# Patient Record
Sex: MALE | Race: WHITE | NOT HISPANIC OR LATINO | Employment: FULL TIME | URBAN - METROPOLITAN AREA
[De-identification: names, ages, dates, MRNs, and addresses within clinical notes are randomized per-mention and may not be internally consistent; named-entity substitution may affect disease eponyms.]

---

## 2024-04-12 ENCOUNTER — OFFICE VISIT (OUTPATIENT)
Dept: PRIMARY CARE | Facility: CLINIC | Age: 39
End: 2024-04-12
Payer: COMMERCIAL

## 2024-04-12 ENCOUNTER — HOSPITAL ENCOUNTER (OUTPATIENT)
Dept: RADIOLOGY | Facility: CLINIC | Age: 39
Discharge: HOME | End: 2024-04-12
Payer: COMMERCIAL

## 2024-04-12 VITALS
HEIGHT: 64 IN | DIASTOLIC BLOOD PRESSURE: 70 MMHG | RESPIRATION RATE: 14 BRPM | WEIGHT: 145 LBS | BODY MASS INDEX: 24.75 KG/M2 | HEART RATE: 62 BPM | SYSTOLIC BLOOD PRESSURE: 101 MMHG | OXYGEN SATURATION: 98 %

## 2024-04-12 DIAGNOSIS — Z00.00 HEALTH CARE MAINTENANCE: Primary | ICD-10-CM

## 2024-04-12 DIAGNOSIS — R06.02 SHORTNESS OF BREATH: ICD-10-CM

## 2024-04-12 DIAGNOSIS — R07.89 MUSCULOSKELETAL CHEST PAIN: ICD-10-CM

## 2024-04-12 PROBLEM — S52.502A CLOSED FRACTURE OF LEFT DISTAL RADIUS: Status: ACTIVE | Noted: 2024-04-12

## 2024-04-12 PROCEDURE — 71046 X-RAY EXAM CHEST 2 VIEWS: CPT | Performed by: RADIOLOGY

## 2024-04-12 PROCEDURE — 1036F TOBACCO NON-USER: CPT | Performed by: INTERNAL MEDICINE

## 2024-04-12 PROCEDURE — 99203 OFFICE O/P NEW LOW 30 MIN: CPT | Performed by: INTERNAL MEDICINE

## 2024-04-12 PROCEDURE — 71046 X-RAY EXAM CHEST 2 VIEWS: CPT

## 2024-04-12 NOTE — PROGRESS NOTES
"Subjective   Patient ID: Patrice Brown is a 38 y.o. male who presents for No chief complaint on file..    HPI     Patient is a 38-year-old male with no significant past medical history presents with chief complaint of anterior chest pain.  Patient states that it comes and goes.  He cannot say how long it lasts for.  He did go to the emergency room on March 30, 2023 for and he had a CT abdomen and pelvis which was for the most part unremarkable.  No chest imaging was done.  There is some tenderness to palpation.  He denies any shortness of breath when he takes a deep breath they can often exacerbate the anterior chest pain.  His pulse ox is 98% on room air and his heart rate is quite low at 64.  No recent travel.  No history of cigarette smoking.  No history of asthma or COPD.  The pain is slightly reproducible.  The pain he describes it more of a burning of the anterior chest.    Review of Systems  Constitutional: No fever or chills  Cardiovascular: no chest pain, no palpitations and no syncope.   Respiratory: no cough, no shortness of breath during exertion and no shortness of breath at rest.   Gastrointestinal: no abdominal pain, no nausea and no vomiting.  Neuro: No Headache, no dizziness    Objective   /70   Pulse 62   Resp 14   Ht 1.626 m (5' 4\")   Wt 65.8 kg (145 lb)   SpO2 98%   BMI 24.89 kg/m²     Physical Exam  Constitutional: Alert and in no acute distress. Well developed, well nourished  Head and Face: Head and face: Normal.    Cardiovascular: Heart rate and rhythm were normal, normal S1 and S2. No peripheral edema.   Pulmonary: No respiratory distress. Clear bilateral breath sounds.  Musculoskeletal: Gait and station: Normal. Muscle strength/tone: Normal.   Skin: Normal skin color and pigmentation, normal skin turgor, and no rash.    Psychiatric: Judgment and insight: Intact. Mood and affect: Normal.  Abdomen soft nontender nondistended positive bowel sounds x 4    Procedures    Lab Results "   Component Value Date    WBC 8.8 12/30/2019    HGB 13.5 12/30/2019    HCT 39.2 (L) 12/30/2019     12/30/2019     12/30/2019    K 3.7 12/30/2019     (H) 12/30/2019    CREATININE 0.83 12/30/2019    BUN 16 12/30/2019    CO2 25 12/30/2019    INR 1.0 07/14/2019       WRIST  MRN: [id number]  Patient Name: KATY HAYNES     STUDY:  WRIST COMPLT; MIN 3 VIEWS     INDICATION:  stiffness, need to ensure healed.     COMPARISON:  January 6, 2020     ACCESSION NUMBER(S):  [id number]     ORDERING CLINICIAN:  HOLLIS HOWARD     FINDINGS:  Right distal radial fracture is healed with some residual deformity.  The ghost tracts from the previous fixation hardware continue to heal  Ulnar styloid fracture with nonunion.     IMPRESSION:  Interval healing of the right distal radial fracture.            Assessment/Plan   Problem List Items Addressed This Visit    None  Visit Diagnoses         Codes    Health care maintenance    -  Primary Z00.00    Relevant Orders    Lipid Panel    TSH with reflex to Free T4 if abnormal    Shortness of breath     R06.02    Relevant Orders    D-dimer, quantitative    XR chest 2 views    Musculoskeletal chest pain     R07.89          More than likely the symptoms are consistent with musculoskeletal chest pain.  Recommend ibuprofen 400 mg 3 times a day.  Will check chest x-ray to rule out intra pulmonary pathology.  Check D-dimer.  If elevated will obtain CT chest angio.  Check TSH.  While there could be an anxiety component to it as such recommend an outpatient diary of symptoms and document what is going on at the time of the symptoms appearing.  Follow-up as needed

## 2024-09-23 ENCOUNTER — APPOINTMENT (OUTPATIENT)
Dept: PRIMARY CARE | Facility: CLINIC | Age: 39
End: 2024-09-23
Payer: COMMERCIAL

## 2024-09-23 VITALS
WEIGHT: 140 LBS | SYSTOLIC BLOOD PRESSURE: 145 MMHG | OXYGEN SATURATION: 98 % | DIASTOLIC BLOOD PRESSURE: 78 MMHG | BODY MASS INDEX: 24.03 KG/M2 | HEART RATE: 57 BPM

## 2024-09-23 DIAGNOSIS — F41.9 ANXIETY: Primary | ICD-10-CM

## 2024-09-23 PROCEDURE — 99213 OFFICE O/P EST LOW 20 MIN: CPT | Performed by: INTERNAL MEDICINE

## 2024-09-23 PROCEDURE — 1036F TOBACCO NON-USER: CPT | Performed by: INTERNAL MEDICINE

## 2024-09-23 RX ORDER — SERTRALINE HYDROCHLORIDE 50 MG/1
50 TABLET, FILM COATED ORAL DAILY
Qty: 30 TABLET | Refills: 1 | Status: SHIPPED | OUTPATIENT
Start: 2024-09-23 | End: 2024-11-22

## 2024-09-23 NOTE — PROGRESS NOTES
Subjective   Patient ID: Patrice Brown is a 39 y.o. male who presents for Panic Attack.    HPI     Patient is a 39-year-old male with no significant past medical history presents for sick visit.  Patient reports episodes of palpitations that can last upwards of 30 minutes.  They self terminate.  No feeling of doom or gloom.  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  Sometimes he has to go in the middle of the night to unsafe neighborhoods and apartment complexes.  He occasionally gets chest pains that goes away as distress subsides.  No shortness of breath and no current chest pain.        Review of Systems  Constitutional: No fever or chills  Cardiovascular: no chest pain, no palpitations and no syncope.   Respiratory: no cough, no shortness of breath during exertion and no shortness of breath at rest.   Gastrointestinal: no abdominal pain, no nausea and no vomiting.  Neuro: No Headache, no dizziness    Objective   /78   Pulse 57   Wt 63.5 kg (140 lb)   SpO2 98%   BMI 24.03 kg/m²     Physical Exam  Constitutional: Alert and in no acute distress. Well developed, well nourished  Head and Face: Head and face: Normal.    Cardiovascular: Heart rate and rhythm were normal, normal S1 and S2. No peripheral edema.   Pulmonary: No respiratory distress. Clear bilateral breath sounds.  Musculoskeletal: Gait and station: Normal. Muscle strength/tone: Normal.   Skin: Normal skin color and pigmentation, normal skin turgor, and no rash.    Psychiatric: Judgment and insight: Intact. Mood and affect: Normal.    Procedures    Lab Results   Component Value Date    WBC 8.8 12/30/2019    HGB 13.5 12/30/2019    HCT 39.2 (L) 12/30/2019     12/30/2019     12/30/2019    K 3.7 12/30/2019     (H) 12/30/2019    CREATININE 0.83 12/30/2019    BUN 16 12/30/2019    CO2 25 12/30/2019    INR 1.0 07/14/2019       XR chest 2 views  Narrative: Interpreted By:  Wilfrido Stewart,   STUDY:  XR CHEST 2  VIEWS Dual energy subtraction      INDICATION:  Signs/Symptoms:chest pain.      COMPARISON:  September 26, 2019          ACCESSION NUMBER(S):  [id number]      ORDERING CLINICIAN:  SUSAN ARMSTRONG      FINDINGS:  No consolidation, effusion, edema, or pneumothorax. Heart size and  mediastinal structures within normal limits.      Impression: No evidence of acute intrathoracic abnormality.      Signed by: Wilfrido Stewart 4/13/2024 8:00 AM  Dictation workstation:   [id number]            Assessment/Plan   Problem List Items Addressed This Visit             ICD-10-CM    Anxiety - Primary F41.9    Relevant Medications    sertraline (Zoloft) 50 mg tablet    Other Relevant Orders    Follow Up In [facility] - PCP - Established     Probable anxiety.  Will start sertraline 50 mg once daily.  Follow-up 30 days for reevaluation.

## 2024-09-24 ENCOUNTER — OFFICE VISIT (OUTPATIENT)
Dept: URGENT CARE | Age: 39
End: 2024-09-24
Payer: COMMERCIAL

## 2024-09-24 ENCOUNTER — TELEPHONE (OUTPATIENT)
Dept: PRIMARY CARE | Facility: CLINIC | Age: 39
End: 2024-09-24
Payer: COMMERCIAL

## 2024-09-24 VITALS
HEART RATE: 65 BPM | TEMPERATURE: 98.5 F | WEIGHT: 140 LBS | OXYGEN SATURATION: 97 % | BODY MASS INDEX: 24.03 KG/M2 | DIASTOLIC BLOOD PRESSURE: 87 MMHG | SYSTOLIC BLOOD PRESSURE: 127 MMHG | RESPIRATION RATE: 19 BRPM

## 2024-09-24 DIAGNOSIS — F41.0 PANIC DISORDER: Primary | ICD-10-CM

## 2024-09-24 NOTE — PROGRESS NOTES
Subjective   Patient ID: Patrice Brown is a 39 y.o. male. They present today with a chief complaint of Other (Blurry vision, sweats ).    History of Present Illness  Reports intermittent waves of warmth going from his legs up to his neck, sometimes associated with a racing heart x2 weeks. Symptoms last for a few minutes up to an hour without clear trigger. States it feels like his skin in sunburned. States his resting heart rate is usually in the 60s, but will go up to 120-130 when symptoms occur. States he has the feeling of warmth in his legs and chest currently, but no palpitations. No pain, SOB, numbness, tingling, weakness, fever, URI symptoms. Started on sertaline but started it only today. Otherwise denies supplements. States he works long hours, is always on the move, and is under a lot of stress from work x7 years but has never had these symptoms. Was evaluated in the ED 3 days ago for palpitations, EKG normal, referred to cardiology for holter monitor.          Past Medical History  Allergies as of 09/24/2024 - Reviewed 09/24/2024   Allergen Reaction Noted    Vancomycin Other 04/12/2024       (Not in a hospital admission)       Past Medical History:   Diagnosis Date    Other specified health status     No pertinent past medical history       Past Surgical History:   Procedure Laterality Date    OTHER SURGICAL HISTORY  10/17/2019    Wrist surgery        reports that he has never smoked. He has never used smokeless tobacco.    Review of Systems  Pertinent systems reviewed and were negative unless otherwise stated in HPI.    Objective    Vitals:    09/24/24 1312   BP: 127/87   Pulse: 65   Resp: 19   Temp: 36.9 °C (98.5 °F)   SpO2: 97%   Weight: 63.5 kg (140 lb)     No LMP for male patient.    Physical Exam  Constitutional:       General: He is not in acute distress.  HENT:      Right Ear: Tympanic membrane and ear canal normal.      Left Ear: Tympanic membrane and ear canal normal.      Nose: Nose normal.       Mouth/Throat:      Mouth: Mucous membranes are moist.      Pharynx: Oropharynx is clear.   Eyes:      Conjunctiva/sclera: Conjunctivae normal.   Cardiovascular:      Rate and Rhythm: Normal rate and regular rhythm.      Pulses: Normal pulses.      Heart sounds: Normal heart sounds.   Pulmonary:      Effort: Pulmonary effort is normal. No respiratory distress.      Breath sounds: No wheezing.   Skin:     Findings: No rash.   Psychiatric:         Mood and Affect: Mood normal.         Behavior: Behavior normal.         Diagnostic study results (if any) were reviewed by Surya Peñaloza PA-C.    Assessment/Plan   Allergies, medications, history, and pertinent labs/EKGs/imaging reviewed by Surya Peñaloza PA-C.     Medical Decision Making  Low concern for MI, PE. Likely panic disorder from stress and anxiety. Patient mentioned hyperthyroidism, however I advised any evaluation for this would need to come from primary care. ED precautions given. No indication for EKG, a patient's vitals are WNL, heart regular rhythm and no palpitations currently. Has Holter monitor referral in. Had reassuring ED evaluation 3 days ago.     Orders and Diagnoses  Diagnoses and all orders for this visit:  Panic disorder      Medical Admin Record      Disposition: Home    Electronically signed by Surya Peñaloza PA-C

## 2024-09-24 NOTE — PATIENT INSTRUCTIONS
Please follow up with your primary provider within one week if symptoms do not improve.  You may schedule an appointment online at [email or web address] or call [phone]. Go to the Emergency Department if symptoms significantly worsen or if you develop chest pain or shortness of breath.

## 2024-10-30 ENCOUNTER — APPOINTMENT (OUTPATIENT)
Dept: PRIMARY CARE | Facility: CLINIC | Age: 39
End: 2024-10-30
Payer: COMMERCIAL

## 2024-10-30 VITALS
SYSTOLIC BLOOD PRESSURE: 121 MMHG | HEART RATE: 51 BPM | OXYGEN SATURATION: 98 % | WEIGHT: 142 LBS | DIASTOLIC BLOOD PRESSURE: 79 MMHG | BODY MASS INDEX: 24.37 KG/M2

## 2024-10-30 DIAGNOSIS — F41.9 ANXIETY: ICD-10-CM

## 2024-10-30 PROCEDURE — 99213 OFFICE O/P EST LOW 20 MIN: CPT | Performed by: INTERNAL MEDICINE

## 2024-10-30 PROCEDURE — 1036F TOBACCO NON-USER: CPT | Performed by: INTERNAL MEDICINE

## 2024-10-30 RX ORDER — SERTRALINE HYDROCHLORIDE 50 MG/1
50 TABLET, FILM COATED ORAL DAILY
Qty: 90 TABLET | Refills: 1 | Status: SHIPPED | OUTPATIENT
Start: 2024-10-30 | End: 2025-04-28

## 2024-12-26 ENCOUNTER — APPOINTMENT (OUTPATIENT)
Dept: URGENT CARE | Age: 39
End: 2024-12-26
Payer: COMMERCIAL

## 2025-01-02 ENCOUNTER — APPOINTMENT (OUTPATIENT)
Dept: PRIMARY CARE | Facility: CLINIC | Age: 40
End: 2025-01-02
Payer: COMMERCIAL

## 2025-01-02 VITALS
SYSTOLIC BLOOD PRESSURE: 107 MMHG | HEART RATE: 58 BPM | DIASTOLIC BLOOD PRESSURE: 73 MMHG | WEIGHT: 142 LBS | BODY MASS INDEX: 24.37 KG/M2 | OXYGEN SATURATION: 97 %

## 2025-01-02 DIAGNOSIS — G25.3 MYOCLONUS: Primary | ICD-10-CM

## 2025-01-02 PROCEDURE — 1036F TOBACCO NON-USER: CPT | Performed by: INTERNAL MEDICINE

## 2025-01-02 PROCEDURE — 99213 OFFICE O/P EST LOW 20 MIN: CPT | Performed by: INTERNAL MEDICINE

## 2025-01-02 RX ORDER — EPINEPHRINE 0.22MG
100 AEROSOL WITH ADAPTER (ML) INHALATION DAILY
Qty: 30 CAPSULE | Refills: 11 | Status: SHIPPED | OUTPATIENT
Start: 2025-01-02 | End: 2026-01-02

## 2025-01-02 ASSESSMENT — ENCOUNTER SYMPTOMS: MUSCULOSKELETAL PAIN: 1

## 2025-01-02 NOTE — PROGRESS NOTES
Subjective   Patient ID: Patrice Brown is a 39 y.o. male who presents for Follow-up and Muscle Pain.    Musculoskeletal Pain         Patient is a 39-year-old male with past medical history of anxiety currently on sertraline with good improvement presents complaining of muscle twitching of the lower extremities.  Mostly affecting the left lower extremity but occasionally the right.  He went to the emergency room and electrolytes were not abnormal.  Glucose was slightly elevated at 111.  He stopped the Aleve prior to going to the emergency room and states that the twitching had improved.  In the emergency room the only abnormal finding was arguably the magnesium which was 1.9.  No fevers or chills and no muscle weakness.  No numbness or tingling of the extremities    Review of Systems  Constitutional: No fever or chills  Cardiovascular: no chest pain, no palpitations and no syncope.   Respiratory: no cough, no shortness of breath during exertion and no shortness of breath at rest.   Gastrointestinal: no abdominal pain, no nausea and no vomiting.  Neuro: No Headache, no dizziness    Objective   /73   Pulse 58   Wt 64.4 kg (142 lb)   SpO2 97%   BMI 24.37 kg/m²     Physical Exam  Constitutional: Alert and in no acute distress. Well developed, well nourished  Head and Face: Head and face: Normal.    Cardiovascular: Heart rate and rhythm were normal, normal S1 and S2. No peripheral edema.   Pulmonary: No respiratory distress. Clear bilateral breath sounds.  Musculoskeletal: Gait and station: Normal. Muscle strength/tone: Normal.   Skin: Normal skin color and pigmentation, normal skin turgor, and no rash.    Psychiatric: Judgment and insight: Intact. Mood and affect: Normal.    Procedures    Lab Results   Component Value Date    WBC 8.8 12/30/2019    HGB 13.5 12/30/2019    HCT 39.2 (L) 12/30/2019     12/30/2019     12/30/2019    K 3.7 12/30/2019     (H) 12/30/2019    CREATININE 0.83 12/30/2019     BUN 16 12/30/2019    CO2 25 12/30/2019    INR 1.0 07/14/2019       XR chest 2 views  Narrative: Interpreted By:  Wilfrido Stewart,   STUDY:  XR CHEST 2 VIEWS Dual energy subtraction      INDICATION:  Signs/Symptoms:chest pain.      COMPARISON:  September 26, 2019          ACCESSION NUMBER(S):  [id number]      ORDERING CLINICIAN:  SUSAN ARSMTRONG      FINDINGS:  No consolidation, effusion, edema, or pneumothorax. Heart size and  mediastinal structures within normal limits.      Impression: No evidence of acute intrathoracic abnormality.      Signed by: Wilfrido Stewart 4/13/2024 8:00 AM  Dictation workstation:   [id number]            Assessment/Plan   Problem List Items Addressed This Visit    None  Visit Diagnoses         Codes    Myoclonus    -  Primary G25.3    Relevant Medications    coenzyme Q-10 (Co Q-10) 100 mg capsule        Continue magnesium oxide and start coenzyme every 10.  Drink plenty of water and electrolyte-based beverages.  Decrease sugar intake.  Reassess symptoms if symptoms do not improve then 1 to 2 months

## 2025-04-30 ENCOUNTER — APPOINTMENT (OUTPATIENT)
Dept: PRIMARY CARE | Facility: CLINIC | Age: 40
End: 2025-04-30
Payer: COMMERCIAL

## 2025-04-30 VITALS
DIASTOLIC BLOOD PRESSURE: 76 MMHG | OXYGEN SATURATION: 97 % | BODY MASS INDEX: 25.23 KG/M2 | HEART RATE: 51 BPM | WEIGHT: 147 LBS | SYSTOLIC BLOOD PRESSURE: 115 MMHG

## 2025-04-30 DIAGNOSIS — F41.9 ANXIETY: ICD-10-CM

## 2025-04-30 DIAGNOSIS — G25.3 MYOCLONUS: Primary | ICD-10-CM

## 2025-04-30 PROCEDURE — 1036F TOBACCO NON-USER: CPT | Performed by: INTERNAL MEDICINE

## 2025-04-30 PROCEDURE — 99213 OFFICE O/P EST LOW 20 MIN: CPT | Performed by: INTERNAL MEDICINE

## 2025-04-30 ASSESSMENT — ENCOUNTER SYMPTOMS: MUSCULOSKELETAL PAIN: 1

## 2025-04-30 NOTE — ASSESSMENT & PLAN NOTE
Could be a side effect of the Zoloft.  Patient wishes to continue the Zoloft rather than take a drug holiday.  Symptoms are quite tolerable.  No obvious source.  Recommend symptom diary at this time and follow-up in 3 to 6 months

## 2025-04-30 NOTE — PROGRESS NOTES
Subjective   Patient ID: Patrice Brown is a 39 y.o. male who presents for Follow-up.    Muscle Pain         Patient is a 39-year-old male with past medical history of anxiety currently on sertraline with good improvement presents complaining of muscle twitching of the lower extremities.  Mostly affecting the left lower extremity but occasionally the right.  He went to the emergency room and electrolytes were not abnormal.  Glucose was slightly elevated at 111.  He tried magnesium oxide and co-Q10 without significant improvement.  There is been no changes in his symptoms.  Symptoms appear to have begun after increasing the dose of the Zoloft    Review of Systems  Constitutional: No fever or chills  Cardiovascular: no chest pain, no palpitations and no syncope.   Respiratory: no cough, no shortness of breath during exertion and no shortness of breath at rest.   Gastrointestinal: no abdominal pain, no nausea and no vomiting.  Neuro: No Headache, no dizziness    Objective   /76   Pulse 51   Wt 66.7 kg (147 lb)   SpO2 97%   BMI 25.23 kg/m²     Physical Exam  Constitutional: Alert and in no acute distress. Well developed, well nourished  Head and Face: Head and face: Normal.    Cardiovascular: Heart rate and rhythm were normal, normal S1 and S2. No peripheral edema.   Pulmonary: No respiratory distress. Clear bilateral breath sounds.  Musculoskeletal: Gait and station: Normal. Muscle strength/tone: Normal.   Skin: Normal skin color and pigmentation, normal skin turgor, and no rash.    Psychiatric: Judgment and insight: Intact. Mood and affect: Normal.    Procedures    Lab Results   Component Value Date    WBC 8.8 12/30/2019    HGB 13.5 12/30/2019    HCT 39.2 (L) 12/30/2019     12/30/2019     12/30/2019    K 3.7 12/30/2019     (H) 12/30/2019    CREATININE 0.83 12/30/2019    BUN 16 12/30/2019    CO2 25 12/30/2019    INR 1.0 07/14/2019       XR chest 2 views  Narrative: Interpreted By:  Terry  Wilfrido,   STUDY:  XR CHEST 2 VIEWS Dual energy subtraction      INDICATION:  Signs/Symptoms:chest pain.      COMPARISON:  September 26, 2019          ACCESSION NUMBER(S):  [id number]      ORDERING CLINICIAN:  SUSAN ARMSTRONG      FINDINGS:  No consolidation, effusion, edema, or pneumothorax. Heart size and  mediastinal structures within normal limits.      Impression: No evidence of acute intrathoracic abnormality.      Signed by: Wilfrido Stewart 4/13/2024 8:00 AM  Dictation workstation:   [id number]            Assessment/Plan   Problem List Items Addressed This Visit           ICD-10-CM    Anxiety F41.9    Myoclonus - Primary G25.3    Could be a side effect of the Zoloft.  Patient wishes to continue the Zoloft rather than take a drug holiday.  Symptoms are quite tolerable.  No obvious source.  Recommend symptom diary at this time and follow-up in 3 to 6 months

## 2025-05-29 DIAGNOSIS — F41.9 ANXIETY: ICD-10-CM

## 2025-05-29 RX ORDER — SERTRALINE HYDROCHLORIDE 50 MG/1
50 TABLET, FILM COATED ORAL DAILY
Qty: 90 TABLET | Refills: 0 | Status: SHIPPED | OUTPATIENT
Start: 2025-05-29

## 2025-07-07 ENCOUNTER — APPOINTMENT (OUTPATIENT)
Dept: PRIMARY CARE | Facility: CLINIC | Age: 40
End: 2025-07-07
Payer: COMMERCIAL

## 2025-07-29 ENCOUNTER — APPOINTMENT (OUTPATIENT)
Dept: PRIMARY CARE | Facility: CLINIC | Age: 40
End: 2025-07-29
Payer: COMMERCIAL

## 2025-07-29 VITALS
SYSTOLIC BLOOD PRESSURE: 112 MMHG | OXYGEN SATURATION: 96 % | BODY MASS INDEX: 25.23 KG/M2 | DIASTOLIC BLOOD PRESSURE: 76 MMHG | WEIGHT: 147 LBS | HEART RATE: 55 BPM

## 2025-07-29 DIAGNOSIS — Z00.00 HEALTH CARE MAINTENANCE: ICD-10-CM

## 2025-07-29 DIAGNOSIS — F41.9 ANXIETY: Primary | ICD-10-CM

## 2025-07-29 PROCEDURE — 99213 OFFICE O/P EST LOW 20 MIN: CPT | Performed by: INTERNAL MEDICINE

## 2025-07-29 PROCEDURE — 1036F TOBACCO NON-USER: CPT | Performed by: INTERNAL MEDICINE

## 2025-07-29 RX ORDER — SERTRALINE HYDROCHLORIDE 50 MG/1
50 TABLET, FILM COATED ORAL DAILY
Qty: 90 TABLET | Refills: 3 | Status: SHIPPED | OUTPATIENT
Start: 2025-07-29

## 2025-07-29 ASSESSMENT — ENCOUNTER SYMPTOMS: MUSCULOSKELETAL PAIN: 1

## 2025-07-29 NOTE — PROGRESS NOTES
Subjective   Patient ID: Patrice Brown is a 39 y.o. male who presents for Follow-up.    Muscle Pain         Patient is a 39-year-old male with past medical history of anxiety currently on sertraline with good improvement presents complaining of muscle twitching of the lower extremities.  Last visit the sertraline was decreased as it was thought to be a potential side effect.  Patient has been taking co-Q10.  There is been no substantial improvement in the myoclonus.  Affecting bilateral lower extremities.  Occurs randomly.  No other neurologic findings.  He states since decreasing the sertraline his wife has noted difference in his behavior however he himself has not noticed a difference.        Review of Systems  Constitutional: No fever or chills  Cardiovascular: no chest pain, no palpitations and no syncope.   Respiratory: no cough, no shortness of breath during exertion and no shortness of breath at rest.   Gastrointestinal: no abdominal pain, no nausea and no vomiting.  Neuro: No Headache, no dizziness    Objective   /76   Pulse 55   Wt 66.7 kg (147 lb)   SpO2 96%   BMI 25.23 kg/m²     Physical Exam  Constitutional: Alert and in no acute distress. Well developed, well nourished  Head and Face: Head and face: Normal.    Cardiovascular: Heart rate and rhythm were normal, normal S1 and S2. No peripheral edema.   Pulmonary: No respiratory distress. Clear bilateral breath sounds.  Musculoskeletal: Gait and station: Normal. Muscle strength/tone: Normal.   Skin: Normal skin color and pigmentation, normal skin turgor, and no rash.    Psychiatric: Judgment and insight: Intact. Mood and affect: Normal.    Procedures    Lab Results   Component Value Date    WBC 8.8 12/30/2019    HGB 13.5 12/30/2019    HCT 39.2 (L) 12/30/2019     12/30/2019     12/30/2019    K 3.7 12/30/2019     (H) 12/30/2019    CREATININE 0.83 12/30/2019    BUN 16 12/30/2019    CO2 25 12/30/2019    INR 1.0 07/14/2019       XR  chest 2 views  Narrative: Interpreted By:  Wilfrido Stewart,   STUDY:  XR CHEST 2 VIEWS Dual energy subtraction      INDICATION:  Signs/Symptoms:chest pain.      COMPARISON:  September 26, 2019          ACCESSION NUMBER(S):  [id number]      ORDERING CLINICIAN:  SUSAN ARMSTRONG      FINDINGS:  No consolidation, effusion, edema, or pneumothorax. Heart size and  mediastinal structures within normal limits.      Impression: No evidence of acute intrathoracic abnormality.      Signed by: Wilfrido Stewart 4/13/2024 8:00 AM  Dictation workstation:   [id number]            Assessment/Plan   Problem List Items Addressed This Visit           ICD-10-CM    Anxiety - Primary F41.9    Relevant Medications    sertraline (Zoloft) 50 mg tablet    Other Relevant Orders    Follow Up In [facility] - PCP - Established     Other Visit Diagnoses         Codes      Health care maintenance     Z00.00    Relevant Orders    C-reactive protein    CK    Comprehensive metabolic panel    Lipid panel          Myoclonus likely benign.  Will check screening labs but if unremarkable no further workup required.  If symptoms or not getting any better or getting worse can consider neuromuscular referral.    Will increase the sertraline back to the 50 mg dosing.  Follow-up 6 months.

## 2025-07-30 ENCOUNTER — APPOINTMENT (OUTPATIENT)
Dept: PRIMARY CARE | Facility: CLINIC | Age: 40
End: 2025-07-30
Payer: COMMERCIAL

## 2025-07-30 LAB
ALBUMIN SERPL-MCNC: 4.6 G/DL (ref 3.6–5.1)
ALP SERPL-CCNC: 38 U/L (ref 36–130)
ALT SERPL-CCNC: 13 U/L (ref 9–46)
ANION GAP SERPL CALCULATED.4IONS-SCNC: 10 MMOL/L (CALC) (ref 7–17)
AST SERPL-CCNC: 15 U/L (ref 10–40)
BILIRUB SERPL-MCNC: 0.8 MG/DL (ref 0.2–1.2)
BUN SERPL-MCNC: 15 MG/DL (ref 7–25)
CALCIUM SERPL-MCNC: 9.6 MG/DL (ref 8.6–10.3)
CHLORIDE SERPL-SCNC: 100 MMOL/L (ref 98–110)
CHOLEST SERPL-MCNC: 200 MG/DL
CHOLEST/HDLC SERPL: 3.5 (CALC)
CK SERPL-CCNC: 113 U/L (ref 26–366)
CO2 SERPL-SCNC: 28 MMOL/L (ref 20–32)
CREAT SERPL-MCNC: 0.97 MG/DL (ref 0.6–1.29)
CRP SERPL-MCNC: <3 MG/L
EGFRCR SERPLBLD CKD-EPI 2021: 101 ML/MIN/1.73M2
GLUCOSE SERPL-MCNC: 92 MG/DL (ref 65–139)
HDLC SERPL-MCNC: 57 MG/DL
LDLC SERPL CALC-MCNC: 123 MG/DL (CALC)
NONHDLC SERPL-MCNC: 143 MG/DL (CALC)
POTASSIUM SERPL-SCNC: 4.5 MMOL/L (ref 3.5–5.3)
PROT SERPL-MCNC: 6.6 G/DL (ref 6.1–8.1)
SODIUM SERPL-SCNC: 138 MMOL/L (ref 135–146)
TRIGL SERPL-MCNC: 94 MG/DL

## 2026-01-29 ENCOUNTER — APPOINTMENT (OUTPATIENT)
Dept: PRIMARY CARE | Facility: CLINIC | Age: 41
End: 2026-01-29
Payer: COMMERCIAL